# Patient Record
Sex: MALE | Race: WHITE | HISPANIC OR LATINO | Employment: PART TIME | URBAN - METROPOLITAN AREA
[De-identification: names, ages, dates, MRNs, and addresses within clinical notes are randomized per-mention and may not be internally consistent; named-entity substitution may affect disease eponyms.]

---

## 2023-04-14 ENCOUNTER — HOSPITAL ENCOUNTER (EMERGENCY)
Facility: CLINIC | Age: 23
Discharge: HOME | End: 2023-04-14
Admitting: EMERGENCY MEDICINE

## 2023-04-14 VITALS
DIASTOLIC BLOOD PRESSURE: 85 MMHG | HEART RATE: 81 BPM | SYSTOLIC BLOOD PRESSURE: 125 MMHG | OXYGEN SATURATION: 98 % | TEMPERATURE: 98.3 F | RESPIRATION RATE: 20 BRPM

## 2023-04-14 DIAGNOSIS — H15.001 SCLERITIS AND EPISCLERITIS OF RIGHT EYE: ICD-10-CM

## 2023-04-14 DIAGNOSIS — H15.101 SCLERITIS AND EPISCLERITIS OF RIGHT EYE: ICD-10-CM

## 2023-04-14 DIAGNOSIS — S05.8X1A SUPERFICIAL INJURY OF RIGHT CORNEA, INITIAL ENCOUNTER: ICD-10-CM

## 2023-04-14 DIAGNOSIS — H20.9 TRAUMATIC IRITIS: ICD-10-CM

## 2023-04-14 PROCEDURE — 99283 EMERGENCY DEPT VISIT LOW MDM: CPT

## 2023-04-14 RX ORDER — TETRACAINE HYDROCHLORIDE 5 MG/ML
2 SOLUTION OPHTHALMIC ONCE
Status: DISCONTINUED | OUTPATIENT
Start: 2023-04-14 | End: 2023-04-14 | Stop reason: HOSPADM

## 2023-04-14 RX ORDER — POLYMYXIN B SULFATE AND TRIMETHOPRIM 1; 10000 MG/ML; [USP'U]/ML
1-2 SOLUTION OPHTHALMIC EVERY 4 HOURS
Qty: 10 ML | Refills: 0 | Status: SHIPPED | OUTPATIENT
Start: 2023-04-14

## 2023-04-14 ASSESSMENT — ENCOUNTER SYMPTOMS: EYE PAIN: 1

## 2023-04-14 ASSESSMENT — ACTIVITIES OF DAILY LIVING (ADL): ADLS_ACUITY_SCORE: 33

## 2023-04-15 NOTE — ED PROVIDER NOTES
History     Chief Complaint:  Eye Pain       A  was used ([place]).      Rasheed Liz is an otherwise healthy 23 year old male who presents with eye pain. He explains that while cutting wood at work using saw, some wood got behind his work sunglasses and entered his eye approximately 2.5 hours prior to my examination. He describes a sensation of something stuck in his right eye in the upper-medial section. He is able to see out of it and reports his left eye being completely normal.     Independent Historian:   None - Patient Only    Review of External Notes:      ROS:  Review of Systems   Eyes: Positive for pain.   All other systems reviewed and are negative.    Allergies:  No Known Allergies     Medications:    The patient is currently on no regular medications.    Past Medical History:    History reviewed. No past, pertinent medical history.     Past Surgical History:    History reviewed. No past surgical history.      Family History:    History reviewed. No pertinent family history.     Social History:  Presents to the ED alone.   PCP: No primary care provider on file.     Physical Exam     Patient Vitals for the past 24 hrs:   BP Temp Temp src Pulse Resp SpO2   04/14/23 1905 125/85 98.3  F (36.8  C) Temporal 81 20 98 %     Physical Exam    Gen: Resting comfortably   HEENT: Left eye periorbital tissues are unremarkable.  Right eye no significant swelling of the periorbital tissues.  Sclera is injected.  Lids are everted both upper and lower and no foreign body is seen.  Pupils are 4 mm and reactive bilaterally.  On slit-lamp there is no cells or flare.  There is no hyphema.  On fluorescein staining there is no appreciable corneal abrasion.  CV: ppi, regular   Resp: speaking in full sentences without any resp distress  Skin: warm dry well perfused  Neuro: Alert, no gross motor or sensory deficits,  gait stable        Emergency Department Course     Emergency Department Course &  Assessments:  Interventions:  Medications   tetracaine (PONTOCAINE) 0.5 % ophthalmic solution 2 drop (has no administration in time range)   fluorescein (FUL-LENORE) ophthalmic strip 2 strip (has no administration in time range)     Assessments:  193 I obtained history and examined the patient as noted above.    I thoroughly examined the patient's eye. I believe that they are safe for discharge at this time.     Independent Interpretation (X-rays, CTs, rhythm strip):  None    Consultations/Discussion of Management or Tests:  None     Social Determinants of Health affecting care:   None    Disposition:  The patient was discharged to home.     Impression & Plan    CMS Diagnoses: None    Medical Decision Makin-year-old male otherwise healthy here with right eye pain after using a band and cutting wood and feeling something hit his eye.  He did have sunglasses on at the time.  In the right eye is injected with clear tearing.  His pupil is normal as is his extraocular movements.  Symptoms significantly improved with tetracaine.  Fluorescein staining shows no full-thickness injury or corneal abrasion.  Fully everted the upper and lower lids and no appreciable wood foreign bodies are seen.  Discharged home with supportive care Polytrim drops return with new or worsening symptoms otherwise to see an ophthalmologist.  He was comfortable and agreeable with that plan.    Critical Care time:  was 0 minutes for this patient excluding procedures.    Diagnosis:    ICD-10-CM    1. Traumatic iritis  H20.9       2. Scleritis and episcleritis of right eye  H15.001     H15.101       3. Superficial injury of right cornea, initial encounter  S05.8X1A         Scribe Disclosure:  I, Tree Duenas, am serving as a scribe at 8:21 PM on 2023 to document services personally performed by Tamir Marinelli MD based on my observations and the provider's statements to me.   2023        Tamir Marinelli MD  23  [id number]

## 2023-04-15 NOTE — ED TRIAGE NOTES
Patient got a piece of wood in his right eye at work while using a skill-saw. Erythema to eye. Happened around 1700. Attempted to rinse it out. Difficult for patient to open eye in triage